# Patient Record
Sex: FEMALE | Race: WHITE | URBAN - METROPOLITAN AREA
[De-identification: names, ages, dates, MRNs, and addresses within clinical notes are randomized per-mention and may not be internally consistent; named-entity substitution may affect disease eponyms.]

---

## 2022-05-03 ENCOUNTER — OFFICE VISIT (OUTPATIENT)
Dept: URBAN - METROPOLITAN AREA CLINIC 23 | Facility: CLINIC | Age: 72
End: 2022-05-03
Payer: MEDICARE

## 2022-05-03 DIAGNOSIS — H43.813 VITREOUS DEGENERATION, BILATERAL: ICD-10-CM

## 2022-05-03 DIAGNOSIS — H25.13 AGE-RELATED NUCLEAR CATARACT, BILATERAL: Primary | ICD-10-CM

## 2022-05-03 PROCEDURE — 99204 OFFICE O/P NEW MOD 45 MIN: CPT | Performed by: OPTOMETRIST

## 2022-05-03 ASSESSMENT — VISUAL ACUITY
OS: 20/40
OD: 20/50

## 2022-05-03 ASSESSMENT — KERATOMETRY
OS: 39.00
OD: 40.00

## 2022-05-03 ASSESSMENT — INTRAOCULAR PRESSURE
OD: 15
OS: 15

## 2022-07-26 ENCOUNTER — OFFICE VISIT (OUTPATIENT)
Dept: URBAN - METROPOLITAN AREA CLINIC 23 | Facility: CLINIC | Age: 72
End: 2022-07-26
Payer: MEDICARE

## 2022-07-26 DIAGNOSIS — H17.89 OTHER CORNEAL SCARS: ICD-10-CM

## 2022-07-26 DIAGNOSIS — Q14.3 CONGENITAL MALFORMATION OF CHOROID: ICD-10-CM

## 2022-07-26 DIAGNOSIS — H43.813 VITREOUS DEGENERATION, BILATERAL: ICD-10-CM

## 2022-07-26 DIAGNOSIS — H25.13 AGE-RELATED NUCLEAR CATARACT, BILATERAL: Primary | ICD-10-CM

## 2022-07-26 PROCEDURE — 99203 OFFICE O/P NEW LOW 30 MIN: CPT | Performed by: OPHTHALMOLOGY

## 2022-07-26 ASSESSMENT — VISUAL ACUITY
OS: 20/25
OD: 20/25

## 2022-07-26 ASSESSMENT — KERATOMETRY
OS: 40.25
OD: 43.13

## 2022-07-26 ASSESSMENT — INTRAOCULAR PRESSURE
OD: 16
OS: 16

## 2022-07-26 NOTE — IMPRESSION/PLAN
Impression: Age-related nuclear cataract, bilateral: H25.13. S/P RK OU Plan: Discussed diagnosis in detail with patient. No surgical treatment currently recommended. The patient will monitor vision changes and contact us with any decrease in vision. Recommend frequent use of artificial tears especially while reading. Recommend wearing yellow-orange tint glasses at night while driving to reduce glare.

## 2022-07-26 NOTE — IMPRESSION/PLAN
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 Patient instructed to use artificial tears as needed. Will continue to observe condition and or symptoms.

## 2022-07-26 NOTE — IMPRESSION/PLAN
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 Plan: Will continue to observe condition and or symptoms.